# Patient Record
Sex: MALE | Race: WHITE | NOT HISPANIC OR LATINO | Employment: FULL TIME | URBAN - METROPOLITAN AREA
[De-identification: names, ages, dates, MRNs, and addresses within clinical notes are randomized per-mention and may not be internally consistent; named-entity substitution may affect disease eponyms.]

---

## 2021-09-04 ENCOUNTER — HOSPITAL ENCOUNTER (EMERGENCY)
Facility: CLINIC | Age: 56
Discharge: HOME | End: 2021-09-04
Attending: STUDENT IN AN ORGANIZED HEALTH CARE EDUCATION/TRAINING PROGRAM | Admitting: STUDENT IN AN ORGANIZED HEALTH CARE EDUCATION/TRAINING PROGRAM
Payer: COMMERCIAL

## 2021-09-04 VITALS
OXYGEN SATURATION: 97 % | DIASTOLIC BLOOD PRESSURE: 76 MMHG | TEMPERATURE: 97.9 F | SYSTOLIC BLOOD PRESSURE: 124 MMHG | WEIGHT: 160 LBS | HEART RATE: 82 BPM | RESPIRATION RATE: 18 BRPM

## 2021-09-04 DIAGNOSIS — R33.9 URINARY RETENTION: ICD-10-CM

## 2021-09-04 PROBLEM — N40.0 BENIGN PROSTATIC HYPERPLASIA WITHOUT LOWER URINARY TRACT SYMPTOMS: Status: ACTIVE | Noted: 2021-09-04

## 2021-09-04 PROBLEM — S39.81XA SPORTS HERNIA: Status: ACTIVE | Noted: 2021-09-04

## 2021-09-04 PROBLEM — J30.9 ALLERGIC RHINITIS, UNSPECIFIED: Status: ACTIVE | Noted: 2021-09-04

## 2021-09-04 PROBLEM — M25.512 PAIN IN LEFT SHOULDER: Status: ACTIVE | Noted: 2018-05-30

## 2021-09-04 PROBLEM — H52.10 MYOPIA: Status: ACTIVE | Noted: 2021-09-04

## 2021-09-04 PROCEDURE — 250N000013 HC RX MED GY IP 250 OP 250 PS 637: Performed by: STUDENT IN AN ORGANIZED HEALTH CARE EDUCATION/TRAINING PROGRAM

## 2021-09-04 PROCEDURE — 51798 US URINE CAPACITY MEASURE: CPT

## 2021-09-04 PROCEDURE — 250N000009 HC RX 250: Performed by: STUDENT IN AN ORGANIZED HEALTH CARE EDUCATION/TRAINING PROGRAM

## 2021-09-04 PROCEDURE — 51702 INSERT TEMP BLADDER CATH: CPT

## 2021-09-04 PROCEDURE — 99284 EMERGENCY DEPT VISIT MOD MDM: CPT

## 2021-09-04 RX ORDER — BISACODYL 10 MG
20 SUPPOSITORY, RECTAL RECTAL DAILY PRN
Qty: 4 SUPPOSITORY | Refills: 0 | Status: SHIPPED | OUTPATIENT
Start: 2021-09-04

## 2021-09-04 RX ORDER — POLYETHYLENE GLYCOL 3350 17 G/17G
1 POWDER, FOR SOLUTION ORAL DAILY
Qty: 85 G | Refills: 0 | Status: SHIPPED | OUTPATIENT
Start: 2021-09-04 | End: 2021-09-09

## 2021-09-04 RX ORDER — ACETAMINOPHEN 325 MG/1
975 TABLET ORAL ONCE
Status: COMPLETED | OUTPATIENT
Start: 2021-09-04 | End: 2021-09-04

## 2021-09-04 RX ORDER — IBUPROFEN 600 MG/1
600 TABLET, FILM COATED ORAL ONCE
Status: COMPLETED | OUTPATIENT
Start: 2021-09-04 | End: 2021-09-04

## 2021-09-04 RX ORDER — LIDOCAINE HYDROCHLORIDE 20 MG/ML
10 JELLY TOPICAL ONCE
Status: COMPLETED | OUTPATIENT
Start: 2021-09-04 | End: 2021-09-04

## 2021-09-04 RX ADMIN — LIDOCAINE HYDROCHLORIDE 10 ML: 20 JELLY TOPICAL at 19:12

## 2021-09-04 RX ADMIN — ACETAMINOPHEN 975 MG: 325 TABLET ORAL at 19:44

## 2021-09-04 RX ADMIN — IBUPROFEN 600 MG: 600 TABLET, FILM COATED ORAL at 19:44

## 2021-09-04 NOTE — ED PROVIDER NOTES
Emergency Department Encounter         FINAL IMPRESSION:  Urinary retention, constipation        ED COURSE AND MEDICAL DECISION MAKING     6:48 PM I met with the patient, obtained history, performed an initial exam, and discussed options and plan for diagnostics and treatment here in the ED.  7:08 PM Reevaluated patient  7:19 PM Checked in on and updated patient.     ED Course as of Sep 04 1853   Sat Sep 04, 2021   [id number] Patient is a healthy 56-year-old here with lower abdominal discomfort constipation intermittent rectal bleeding and urinary retention. Had a hernia repair 3 days ago at [place] since then has had difficulty with urination and bowel movements. No fevers or vomiting. On arrival his vitals are stable. He attempted to use the bathroom once he arrived which was unsuccessful. Heart and lungs are normal. Abdominal examination revealing a fullness in the lower abdomen. Bladder scan showing over 1 L of urine.        -Patient with significant improvement of his initial symptoms.  Over 1 L out.  Discussion at bedside regarding constipation management.  Suspect his constipation will get much better since his urine obstruction has been relieved.  -Rectal examination does does not show any impaction.  Patient able to tolerate the procedure well.               At the conclusion of the encounter I discussed the results of all the tests and the disposition. The questions were answered. The patient or family acknowledged understanding and was agreeable with the care plan.                  MEDICATIONS GIVEN IN THE EMERGENCY DEPARTMENT:  Medications - No data to display    NEW PRESCRIPTIONS STARTED AT TODAY'S ED VISIT:  New Prescriptions    No medications on file       HPI     Patient information obtained from: Patient    Use of Interpretor: N/A    Josiah Higgins is a 56 year old male with a pertinent history of GERD and s/p hernia surgery (09/01/21) who presents to this ED via walk in for evaluation of urinary  "retention.     Upon chart review: 09/01/2021 patient had surgery at [facility]. Dr. Ajay Banegas performed robotic right inguinal hernia repair with mesh. Patient was discharged same day with prescription of Oxycodone.     Patient reports urinary retention following hernia surgery on Wednesday (09/01/21). He reports the surgery went fine, but now he is having \"all kinds of problems.\" For the past 2 days (since 09/02/21), patient has been unable to urinate and has not emptied his bladder. Patient states he has a bowel movement when trying to urinate. He notes bleeding, but states it is due to being in and out of the bathroom and irritation to genital area. Patient reports significant abdominal pain but is comfortable lying down. He notes muscle pain but attributes it to stress he has put on muscles when trying to urinate for the past 2 days. Patient's last dose of Oxycodone is today, and he has been taking Advil on and off to alleviate symptoms.     Patient denies chest pain, shortness of breath, vomit, fever, or any other complaints at this time.      REVIEW OF SYSTEMS:  Review of Systems   Constitutional: Negative for fever, malaise  HEENT: Negative runny nose, sore throat, ear pain, neck pain  Respiratory: Negative for shortness of breath, cough, congestion  Cardiovascular: Negative for chest pain, leg edema  Gastrointestinal: Negative for abdominal distention, abdominal pain, vomiting, nausea, diarrhea. Positive for abdominal pain and mild constipation  Genitourinary: Negative for dysuria and hematuria. Positive for urinary retention.   Integument: Negative for rash, skin breakdown  Neurological: Negative for paresthesias, weakness, headache.  Musculoskeletal: Negative for joint pain, joint swelling      All other systems reviewed and are negative.          MEDICAL HISTORY     No past medical history on file.    No past surgical history on file.    Social History     Tobacco Use     Smoking status: Not on file "   Substance Use Topics     Alcohol use: Not on file     Drug use: Not on file       No current outpatient medications on file.          PHYSICAL EXAM     There were no vitals taken for this visit.      PHYSICAL EXAM:     General: Patient appears well, nontoxic, comfortable  HEENT: Moist mucous membranes, no tongue swelling.  No head trauma.  No midline neck pain.  Cardiovascular: Normal rate, normal rhythm, no extremity edema.  No appreciable murmur.  Respiratory: No signs of respiratory distress, lungs are clear to auscultation bilaterally with no wheezes rhonchi or rales.  Abdominal: Soft, lower abdominal discomfort to palpation., nondistended, no palpable masses, no guarding, no rebound. Abdominal examination revealing a fullness in the lower abdomen.  Trocar sites appear well  Musculoskeletal: Full range of motion of joints, no deformities appreciated.  Neurological: Alert and oriented, grossly neurologically intact.  Psychological: Normal affect and mood.  Integument: No rashes appreciated    RESULTS       Labs Ordered and Resulted from Time of ED Arrival Up to the Time of Departure from the ED - No data to display    No orders to display                     PROCEDURES:  Procedures:  Procedures       IVal am serving as a scribe to document services personally performed by Ron Laird DO, based on my observations and the provider's statements to me.  I, Ron Laird DO, attest that Val Lainez is acting in a scribe capacity, has observed my performance of the services and has documented them in accordance with my direction.    Ron Laird DO  Emergency Medicine  [facility] EMERGENCY ROOM       Ron Laird DO  09/04/21 7323       Ron Laird DO  09/04/21 6172

## 2021-09-04 NOTE — ED TRIAGE NOTES
Pt had right inguinal hernia repaired Wednesday at [facility]. Pt states now having abdominal pain to bladder area, d unable to urinate all day states only small amount. Pt states painful BMs today and bleeding to rectal area with wiping. Denies blood thinners. Pt took Milk of Mag earlier and enema earlier has been dealing with some constipation as well due to meds given in surgery. Denies fevers.

## 2021-09-05 NOTE — ED NOTES
Pt provided with supplies for rivas catheter; cleaning wipes, and leg bag. Pt informed on how to change the bags; pt and pt's wife agreeable. Discharge instructions informed to pt.

## 2021-09-05 NOTE — DISCHARGE INSTRUCTIONS
Please take medications as prescribed.  Continue with the over-the-counter constipation medications.  Push oral hydration.    Return for any worsening abdominal pain, fevers, vomiting or any other concerning symptoms.    You will need to follow-up with your primary care doctor in 4 to 5 days for Lynn removal.  Return to the ER for any significant blood in the Lynn, Lynn not draining, or any other concerning symptoms.    Take ibuprofen and Tylenol for pain.

## 2021-12-26 ENCOUNTER — HEALTH MAINTENANCE LETTER (OUTPATIENT)
Age: 56
End: 2021-12-26

## 2022-10-23 ENCOUNTER — HEALTH MAINTENANCE LETTER (OUTPATIENT)
Age: 57
End: 2022-10-23

## 2023-11-05 ENCOUNTER — HEALTH MAINTENANCE LETTER (OUTPATIENT)
Age: 58
End: 2023-11-05

## 2024-02-25 ENCOUNTER — HOSPITAL ENCOUNTER (EMERGENCY)
Facility: CLINIC | Age: 59
Discharge: HOME | End: 2024-02-25
Attending: EMERGENCY MEDICINE | Admitting: EMERGENCY MEDICINE
Payer: COMMERCIAL

## 2024-02-25 VITALS
HEART RATE: 77 BPM | TEMPERATURE: 97.6 F | SYSTOLIC BLOOD PRESSURE: 121 MMHG | RESPIRATION RATE: 18 BRPM | OXYGEN SATURATION: 95 % | DIASTOLIC BLOOD PRESSURE: 75 MMHG

## 2024-02-25 DIAGNOSIS — R33.9 URINARY RETENTION: ICD-10-CM

## 2024-02-25 PROCEDURE — 99284 EMERGENCY DEPT VISIT MOD MDM: CPT | Mod: 25

## 2024-02-25 PROCEDURE — 250N000009 HC RX 250: Performed by: EMERGENCY MEDICINE

## 2024-02-25 PROCEDURE — 51702 INSERT TEMP BLADDER CATH: CPT

## 2024-02-25 PROCEDURE — 51798 US URINE CAPACITY MEASURE: CPT

## 2024-02-25 RX ORDER — LIDOCAINE HYDROCHLORIDE 20 MG/ML
10 JELLY TOPICAL ONCE
Status: COMPLETED | OUTPATIENT
Start: 2024-02-25 | End: 2024-02-25

## 2024-02-25 RX ADMIN — LIDOCAINE HYDROCHLORIDE 10 ML: 20 JELLY TOPICAL at 01:19

## 2024-02-25 NOTE — ED TRIAGE NOTES
Pt has not been able to urinate since yesterday. Enlarged prostate.      Triage Assessment (Adult)       Row Name 02/25/24 0111          Triage Assessment    Airway WDL WDL        Respiratory WDL    Respiratory WDL WDL        Skin Circulation/Temperature WDL    Skin Circulation/Temperature WDL WDL        Cardiac WDL    Cardiac WDL WDL        Peripheral/Neurovascular WDL    Peripheral Neurovascular WDL WDL        Cognitive/Neuro/Behavioral WDL    Cognitive/Neuro/Behavioral WDL WDL

## 2024-02-25 NOTE — ED PROVIDER NOTES
EMERGENCY DEPARTMENT ENCOUNTER      NAME: Josiah Higgins  AGE: 58 year old male  YOB: 1965  MRN: [id number]  EVALUATION DATE & TIME: 2/25/2024  1:12 AM    PCP: Alfie Stephenson    ED PROVIDER: Grzegorz Horton M.D.      Chief Complaint   Patient presents with    Urinary Retention         FINAL IMPRESSION:  1. Urinary retention          ED COURSE & MEDICAL DECISION MAKING:    Pertinent Labs & Imaging studies reviewed. (See chart for details)  58 year old male presents to the Emergency Department for evaluation of urinary retention.  Patient does not minimally urinate since this morning.  Is uncomfortable.  Bladder scan was over 800.  Lynn catheter placed.  Have over 1300 cc out.  Patient feeling better.  Does have a history of enlarged prostate.  Has been worked up for prostate cancer and has been negative.  Will discharge home with Lynn catheter in place.  Follow-up with urology in 3 days.  Return for worsening symptoms.    1:22 AM I met with the patient to gather history and to perform my initial exam. I discussed the plan for care while in the Emergency Department.     At the conclusion of the encounter I discussed the results of all of the tests and the disposition. The questions were answered. The patient or family acknowledged understanding and was agreeable with the care plan.     Medical Decision Making  Obtained supplemental history:Supplemental history obtained?: Documented in chart and Family Member/Significant Other  Reviewed external records: External records reviewed?: Documented in chart  Care impacted by chronic illness:Other: bph  Care significantly affected by social determinants of health:Access to Medical Care  Did you consider but not order tests?: Work up considered but not performed and documented in chart, if applicable  Did you interpret images independently?: Independent interpretation of ECG and images noted in documentation, when applicable.  Consultation discussion with other  provider:Did you involve another provider (consultant, , pharmacy, etc.)?: No  Discharge. No recommendations on prescription strength medication(s). See documentation for any additional details.    MEDICATIONS GIVEN IN THE EMERGENCY:  Medications   lidocaine (XYLOCAINE) 2 % external gel 10 mL (10 mLs Urethral $Given 2/25/24 0119)       NEW PRESCRIPTIONS STARTED AT TODAY'S ER VISIT  Discharge Medication List as of 2/25/2024  1:56 AM             =================================================================    HPI    Patient information was obtained from: Patient    Use of : N/A      Josiah Higgins is a 58 year old male with a pertinent history of bph who presents to this ED for evaluation of urinary retention.    The patient reports here with urinary retention today.  He has been unable to have a complete void and feels the need to urinate but cannot more than a couple of drops.  He has a history of BPH and follows with [facility]. He has had numerous negative prostate biopsies in the past.  He denies any recent medication changes.  He denies any nausea, vomiting, fevers, or other complaints at this time.  Of note, he is on Flomax and Trazodone.    PAST MEDICAL HISTORY:  No past medical history on file.    PAST SURGICAL HISTORY:  No past surgical history on file.        CURRENT MEDICATIONS:    No current facility-administered medications for this encounter.     Current Outpatient Medications   Medication    bisacodyl (DULCOLAX) 10 MG suppository         ALLERGIES:  No Known Allergies    FAMILY HISTORY:  No family history on file.    SOCIAL HISTORY:   Social History     Socioeconomic History    Marital status:        VITALS:  /75   Pulse 77   Temp 97.6  F (36.4  C)   Resp 18   SpO2 95%     PHYSICAL EXAM    Physical Exam  Vitals and nursing note reviewed.   Constitutional:       General: He is not in acute distress.     Appearance: He is not diaphoretic.   HENT:      Head: Atraumatic.    Eyes:      General: No scleral icterus.     Pupils: Pupils are equal, round, and reactive to light.   Cardiovascular:      Rate and Rhythm: Normal rate and regular rhythm.      Heart sounds: Normal heart sounds.   Pulmonary:      Effort: No respiratory distress.      Breath sounds: Normal breath sounds.   Abdominal:      General: There is distension.      Palpations: Abdomen is soft.      Tenderness: There is no abdominal tenderness.   Musculoskeletal:         General: No tenderness.   Lymphadenopathy:      Cervical: No cervical adenopathy.   Skin:     General: Skin is warm.      Findings: No rash.           LAB:  All pertinent labs reviewed and interpreted.  Labs Ordered and Resulted from Time of ED Arrival to Time of ED Departure - No data to display    RADIOLOGY:  Reviewed all pertinent imaging. Please see official radiology report.  No orders to display         I, Alan Huffman, am serving as a scribe to document services personally performed by Dr. Grzegorz Horton, based on my observation and the provider's statements to me. IGrzegorz MD attest that Alan Huffman is acting in a scribe capacity, has observed my performance of the services and has documented them in accordance with my direction.    Grzegorz Horton M.D.  Emergency Medicine  [facility] EMERGENCY ROOM  [address] [facility] [id number] [phone]  Dept: [phone]      Grzegorz Horton MD  02/25/24 3622

## 2024-02-25 NOTE — ED NOTES
Writer introduced self to patient. Patient was instructed at home care for rivas bag and night time leg bag. Patient verbalized along with wife directions and care. No other questions at this time. Output 400mls per discharge

## 2024-08-11 ENCOUNTER — HEALTH MAINTENANCE LETTER (OUTPATIENT)
Age: 59
End: 2024-08-11

## 2025-08-17 ENCOUNTER — HEALTH MAINTENANCE LETTER (OUTPATIENT)
Age: 60
End: 2025-08-17